# Patient Record
Sex: FEMALE | URBAN - METROPOLITAN AREA
[De-identification: names, ages, dates, MRNs, and addresses within clinical notes are randomized per-mention and may not be internally consistent; named-entity substitution may affect disease eponyms.]

---

## 2017-11-13 ENCOUNTER — HOSPITAL ENCOUNTER (OUTPATIENT)
Dept: OBSTETRICS AND GYNECOLOGY | Facility: HOSPITAL | Age: 37
End: 2017-11-14
Attending: OBSTETRICS & GYNECOLOGY | Admitting: OBSTETRICS & GYNECOLOGY

## 2017-11-13 LAB
ABS NEUT (OLG): 5.35 X10(3)/MCL (ref 2.1–9.2)
ALBUMIN SERPL-MCNC: 3.9 GM/DL (ref 3.4–5)
ALBUMIN/GLOB SERPL: 1.1 {RATIO}
ALP SERPL-CCNC: 70 UNIT/L (ref 38–126)
ALT SERPL-CCNC: 20 UNIT/L (ref 12–78)
APPEARANCE, UA: CLEAR
AST SERPL-CCNC: 12 UNIT/L (ref 15–37)
B-HCG FREE SERPL-ACNC: 3033 MIU/ML
BACTERIA SPEC CULT: ABNORMAL /HPF
BASOPHILS # BLD AUTO: 0 X10(3)/MCL (ref 0–0.2)
BASOPHILS NFR BLD AUTO: 0 %
BILIRUB SERPL-MCNC: 0.5 MG/DL (ref 0.2–1)
BILIRUB UR QL STRIP: NEGATIVE
BILIRUBIN DIRECT+TOT PNL SERPL-MCNC: 0.1 MG/DL (ref 0–0.2)
BILIRUBIN DIRECT+TOT PNL SERPL-MCNC: 0.4 MG/DL (ref 0–0.8)
BUN SERPL-MCNC: 5 MG/DL (ref 7–18)
CALCIUM SERPL-MCNC: 9.7 MG/DL (ref 8.5–10.1)
CHLORIDE SERPL-SCNC: 105 MMOL/L (ref 98–107)
CO2 SERPL-SCNC: 22 MMOL/L (ref 21–32)
COLOR UR: YELLOW
CREAT SERPL-MCNC: 0.74 MG/DL (ref 0.55–1.02)
EOSINOPHIL # BLD AUTO: 0 X10(3)/MCL (ref 0–0.9)
EOSINOPHIL NFR BLD AUTO: 0 %
ERYTHROCYTE [DISTWIDTH] IN BLOOD BY AUTOMATED COUNT: 15.4 % (ref 11.5–17)
GLOBULIN SER-MCNC: 3.6 GM/DL (ref 2.4–3.5)
GLUCOSE (UA): NEGATIVE
GLUCOSE SERPL-MCNC: 93 MG/DL (ref 74–106)
HCT VFR BLD AUTO: 41.1 % (ref 37–47)
HGB BLD-MCNC: 14.2 GM/DL (ref 12–16)
HGB UR QL STRIP: ABNORMAL
KETONES UR QL STRIP: NEGATIVE
LEUKOCYTE ESTERASE UR QL STRIP: NEGATIVE
LYMPHOCYTES # BLD AUTO: 2.6 X10(3)/MCL (ref 0.6–4.6)
LYMPHOCYTES NFR BLD AUTO: 30 %
MCH RBC QN AUTO: 29.2 PG (ref 27–31)
MCHC RBC AUTO-ENTMCNC: 34.5 GM/DL (ref 33–36)
MCV RBC AUTO: 84.4 FL (ref 80–94)
MONOCYTES # BLD AUTO: 0.7 X10(3)/MCL (ref 0.1–1.3)
MONOCYTES NFR BLD AUTO: 8 %
NEUTROPHILS # BLD AUTO: 5.35 X10(3)/MCL (ref 2.1–9.2)
NEUTROPHILS NFR BLD AUTO: 61 %
NITRITE UR QL STRIP: NEGATIVE
PH UR STRIP: 6.5 [PH] (ref 5–9)
PLATELET # BLD AUTO: 237 X10(3)/MCL (ref 130–400)
PMV BLD AUTO: 10.7 FL (ref 9.4–12.4)
POTASSIUM SERPL-SCNC: 4.2 MMOL/L (ref 3.5–5.1)
PROT SERPL-MCNC: 7.5 GM/DL (ref 6.4–8.2)
PROT UR QL STRIP: NEGATIVE
RBC # BLD AUTO: 4.87 X10(6)/MCL (ref 4.2–5.4)
RBC #/AREA URNS HPF: 72 /HPF (ref 0–2)
SODIUM SERPL-SCNC: 136 MMOL/L (ref 136–145)
SP GR UR STRIP: 1.01 (ref 1–1.03)
SQUAMOUS EPITHELIAL, UA: ABNORMAL
UROBILINOGEN UR STRIP-ACNC: 0.2
WBC # SPEC AUTO: 8.8 X10(3)/MCL (ref 4.5–11.5)
WBC #/AREA URNS HPF: ABNORMAL /[HPF]

## 2017-11-18 ENCOUNTER — HOSPITAL ENCOUNTER (OUTPATIENT)
Dept: OBSTETRICS AND GYNECOLOGY | Facility: HOSPITAL | Age: 37
End: 2017-11-19
Attending: OBSTETRICS & GYNECOLOGY | Admitting: OBSTETRICS & GYNECOLOGY

## 2017-11-18 LAB
ABS NEUT (OLG): 5.19 X10(3)/MCL (ref 2.1–9.2)
ALBUMIN SERPL-MCNC: 3.2 GM/DL (ref 3.4–5)
ALBUMIN/GLOB SERPL: 0.8 RATIO (ref 1.1–2)
ALP SERPL-CCNC: 59 UNIT/L (ref 38–126)
ALT SERPL-CCNC: 34 UNIT/L (ref 12–78)
APPEARANCE, UA: CLEAR
AST SERPL-CCNC: 23 UNIT/L (ref 15–37)
B-HCG FREE SERPL-ACNC: 730 MIU/ML
BACTERIA SPEC CULT: ABNORMAL /HPF
BASOPHILS # BLD AUTO: 0 X10(3)/MCL (ref 0–0.2)
BASOPHILS NFR BLD AUTO: 0 %
BILIRUB SERPL-MCNC: 0.6 MG/DL (ref 0.2–1)
BILIRUB UR QL STRIP: NEGATIVE
BILIRUBIN DIRECT+TOT PNL SERPL-MCNC: 0.1 MG/DL (ref 0–0.5)
BILIRUBIN DIRECT+TOT PNL SERPL-MCNC: 0.5 MG/DL (ref 0–0.8)
BUN SERPL-MCNC: 6 MG/DL (ref 7–18)
CALCIUM SERPL-MCNC: 8.5 MG/DL (ref 8.5–10.1)
CHLORIDE SERPL-SCNC: 106 MMOL/L (ref 98–107)
CO2 SERPL-SCNC: 22 MMOL/L (ref 21–32)
COLOR UR: YELLOW
CREAT SERPL-MCNC: 0.65 MG/DL (ref 0.55–1.02)
EOSINOPHIL # BLD AUTO: 0.1 X10(3)/MCL (ref 0–0.9)
EOSINOPHIL NFR BLD AUTO: 1 %
ERYTHROCYTE [DISTWIDTH] IN BLOOD BY AUTOMATED COUNT: 14.4 % (ref 11.5–17)
GLOBULIN SER-MCNC: 3.8 GM/DL (ref 2.4–3.5)
GLUCOSE (UA): NEGATIVE
GLUCOSE SERPL-MCNC: 88 MG/DL (ref 74–106)
HCT VFR BLD AUTO: 33 % (ref 37–47)
HGB BLD-MCNC: 11.4 GM/DL (ref 12–16)
HGB UR QL STRIP: ABNORMAL
KETONES UR QL STRIP: NEGATIVE
LEUKOCYTE ESTERASE UR QL STRIP: NEGATIVE
LYMPHOCYTES # BLD AUTO: 2.1 X10(3)/MCL (ref 0.6–4.6)
LYMPHOCYTES NFR BLD AUTO: 28 %
MCH RBC QN AUTO: 28.9 PG (ref 27–31)
MCHC RBC AUTO-ENTMCNC: 34.5 GM/DL (ref 33–36)
MCV RBC AUTO: 83.8 FL (ref 80–94)
MONOCYTES # BLD AUTO: 0.2 X10(3)/MCL (ref 0.1–1.3)
MONOCYTES NFR BLD AUTO: 3 %
NEUTROPHILS # BLD AUTO: 5.19 X10(3)/MCL (ref 1.4–7.9)
NEUTROPHILS NFR BLD AUTO: 68 %
NITRITE UR QL STRIP: NEGATIVE
PH UR STRIP: 6 [PH] (ref 5–9)
PLATELET # BLD AUTO: 220 X10(3)/MCL (ref 130–400)
PMV BLD AUTO: 10.7 FL (ref 9.4–12.4)
POTASSIUM SERPL-SCNC: 3.8 MMOL/L (ref 3.5–5.1)
PROT SERPL-MCNC: 7 GM/DL (ref 6.4–8.2)
PROT UR QL STRIP: NEGATIVE
RBC # BLD AUTO: 3.94 X10(6)/MCL (ref 4.2–5.4)
RBC #/AREA URNS HPF: 70 /HPF (ref 0–2)
SODIUM SERPL-SCNC: 138 MMOL/L (ref 136–145)
SP GR UR STRIP: 1.01 (ref 1–1.03)
SQUAMOUS EPITHELIAL, UA: ABNORMAL
UROBILINOGEN UR STRIP-ACNC: 1
WBC # SPEC AUTO: 7.6 X10(3)/MCL (ref 4.5–11.5)
WBC #/AREA URNS HPF: ABNORMAL /[HPF]

## 2017-11-19 LAB
B-HCG FREE SERPL-ACNC: 505 MIU/ML
HCT VFR BLD AUTO: 32.4 % (ref 37–47)
HGB BLD-MCNC: 11.2 GM/DL (ref 12–16)

## 2017-11-20 ENCOUNTER — HISTORICAL (OUTPATIENT)
Dept: ADMINISTRATIVE | Facility: HOSPITAL | Age: 37
End: 2017-11-20

## 2017-11-20 LAB — B-HCG FREE SERPL-ACNC: 355 MIU/ML

## 2018-04-13 ENCOUNTER — HISTORICAL (OUTPATIENT)
Dept: RADIOLOGY | Facility: HOSPITAL | Age: 38
End: 2018-04-13

## 2018-05-03 ENCOUNTER — HISTORICAL (OUTPATIENT)
Dept: INTERNAL MEDICINE | Facility: CLINIC | Age: 38
End: 2018-05-03

## 2018-05-03 ENCOUNTER — HISTORICAL (OUTPATIENT)
Dept: RADIOLOGY | Facility: HOSPITAL | Age: 38
End: 2018-05-03

## 2018-05-03 LAB
ABS NEUT (OLG): 5 X10(3)/MCL (ref 2.1–9.2)
ALBUMIN SERPL-MCNC: 3.6 GM/DL (ref 3.4–5)
ALBUMIN/GLOB SERPL: 1 RATIO (ref 1–2)
ALP SERPL-CCNC: 73 UNIT/L (ref 45–117)
ALT SERPL-CCNC: 23 UNIT/L (ref 12–78)
APPEARANCE, UA: CLEAR
AST SERPL-CCNC: 16 UNIT/L (ref 15–37)
BACTERIA #/AREA URNS AUTO: ABNORMAL /[HPF]
BASOPHILS # BLD AUTO: 0.05 X10(3)/MCL
BASOPHILS NFR BLD AUTO: 1 %
BILIRUB SERPL-MCNC: 0.3 MG/DL (ref 0.2–1)
BILIRUB UR QL STRIP: NEGATIVE
BILIRUBIN DIRECT+TOT PNL SERPL-MCNC: 0.1 MG/DL
BILIRUBIN DIRECT+TOT PNL SERPL-MCNC: 0.2 MG/DL
BUN SERPL-MCNC: 8 MG/DL (ref 7–18)
CALCIUM SERPL-MCNC: 8.7 MG/DL (ref 8.5–10.1)
CHLORIDE SERPL-SCNC: 107 MMOL/L (ref 98–107)
CHOLEST SERPL-MCNC: 160 MG/DL
CHOLEST/HDLC SERPL: 4.3 {RATIO} (ref 0–4.4)
CO2 SERPL-SCNC: 24 MMOL/L (ref 21–32)
COLOR UR: NORMAL
CREAT SERPL-MCNC: 0.9 MG/DL (ref 0.6–1.3)
EOSINOPHIL # BLD AUTO: 0.3 X10(3)/MCL
EOSINOPHIL NFR BLD AUTO: 3 %
ERYTHROCYTE [DISTWIDTH] IN BLOOD BY AUTOMATED COUNT: 14.6 % (ref 11.5–14.5)
EST. AVERAGE GLUCOSE BLD GHB EST-MCNC: 97 MG/DL
GLOBULIN SER-MCNC: 4.1 GM/ML (ref 2.3–3.5)
GLUCOSE (UA): NORMAL
GLUCOSE SERPL-MCNC: 105 MG/DL (ref 74–106)
HAV IGM SERPL QL IA: NONREACTIVE
HBA1C MFR BLD: 5 % (ref 4.2–6.3)
HBV CORE IGM SERPL QL IA: NONREACTIVE
HBV SURFACE AG SERPL QL IA: NEGATIVE
HCT VFR BLD AUTO: 40.1 % (ref 35–46)
HCV AB SERPL QL IA: NONREACTIVE
HDLC SERPL-MCNC: 37 MG/DL
HGB BLD-MCNC: 13.8 GM/DL (ref 12–16)
HGB UR QL STRIP: NEGATIVE
HIV 1+2 AB+HIV1 P24 AG SERPL QL IA: NONREACTIVE
HYALINE CASTS #/AREA URNS LPF: ABNORMAL /[LPF]
IMM GRANULOCYTES # BLD AUTO: 0.02 10*3/UL
IMM GRANULOCYTES NFR BLD AUTO: 0 %
KETONES UR QL STRIP: NEGATIVE
LDLC SERPL CALC-MCNC: 94 MG/DL (ref 0–130)
LEUKOCYTE ESTERASE UR QL STRIP: NEGATIVE
LYMPHOCYTES # BLD AUTO: 2.55 X10(3)/MCL
LYMPHOCYTES NFR BLD AUTO: 28 % (ref 13–40)
MCH RBC QN AUTO: 28.5 PG (ref 26–34)
MCHC RBC AUTO-ENTMCNC: 34.4 GM/DL (ref 31–37)
MCV RBC AUTO: 82.9 FL (ref 80–100)
MONOCYTES # BLD AUTO: 1.05 X10(3)/MCL
MONOCYTES NFR BLD AUTO: 12 % (ref 4–12)
NEUTROPHILS # BLD AUTO: 5 X10(3)/MCL
NEUTROPHILS NFR BLD AUTO: 56 X10(3)/MCL
NITRITE UR QL STRIP: NEGATIVE
PH UR STRIP: 5 [PH] (ref 4.5–8)
PLATELET # BLD AUTO: 341 X10(3)/MCL (ref 130–400)
PMV BLD AUTO: 10.5 FL (ref 7.4–10.4)
POTASSIUM SERPL-SCNC: 4.1 MMOL/L (ref 3.5–5.1)
PROT SERPL-MCNC: 7.7 GM/DL (ref 6.4–8.2)
PROT UR QL STRIP: NEGATIVE
RBC # BLD AUTO: 4.84 X10(6)/MCL (ref 4–5.2)
RBC #/AREA URNS AUTO: ABNORMAL /[HPF]
SODIUM SERPL-SCNC: 135 MMOL/L (ref 136–145)
SP GR UR STRIP: 1.01 (ref 1–1.03)
SQUAMOUS #/AREA URNS LPF: ABNORMAL /[LPF]
T PALLIDUM AB SER QL: NONREACTIVE
T4 FREE SERPL-MCNC: 0.86 NG/DL (ref 0.76–1.46)
TRIGL SERPL-MCNC: 145 MG/DL
TSH SERPL-ACNC: 0.13 MIU/L (ref 0.36–3.74)
UROBILINOGEN UR STRIP-ACNC: NORMAL
VLDLC SERPL CALC-MCNC: 29 MG/DL
WBC # SPEC AUTO: 9 X10(3)/MCL (ref 4.5–11)
WBC #/AREA URNS AUTO: ABNORMAL /HPF

## 2019-01-07 ENCOUNTER — HISTORICAL (OUTPATIENT)
Dept: INTERNAL MEDICINE | Facility: CLINIC | Age: 39
End: 2019-01-07

## 2022-04-10 ENCOUNTER — HISTORICAL (OUTPATIENT)
Dept: ADMINISTRATIVE | Facility: HOSPITAL | Age: 42
End: 2022-04-10

## 2022-04-26 VITALS
DIASTOLIC BLOOD PRESSURE: 84 MMHG | WEIGHT: 223.13 LBS | BODY MASS INDEX: 43.81 KG/M2 | SYSTOLIC BLOOD PRESSURE: 132 MMHG | OXYGEN SATURATION: 99 % | HEIGHT: 60 IN

## 2022-04-30 NOTE — H&P
Patient:   Sarath Abbott             MRN: [id number]            FIN: [id number]               Age:   37 years     Sex:  Female     :  1980   Associated Diagnoses:   None   Author:   Juarez Mandel MD      Basic Information   Gestational Age:          * Note: EGA calculated as of 2017     No EGA/DORI calculations have been recorded  .       Chief Complaint   Patient has presented to the emergency room with complaints of abdominal/right flank pain.  Patient has had vaginal bleeding over the past 3-4 days.  This is the 3rd emergency room visit and her beta-hCGs have plateaued.  Patient denies any nausea vomiting fever chills chest pain.      History of Present Illness   Patient is     Para Information:   : 6   Para Term: 4   Para : 0   Para Abortions: 1   Para Living: .        Review of Systems   General_ anxious but overall well appearance  HENT_normal  Respiratory_normal  Cardiovascular_normal  Gastrointestinal_normal  Genitourinary_normal  Musculoskeletal_normal  Integumentary_normal         Health Status   Allergies:    Allergic Reactions (All)  Severity Not Documented  Aspirin- Increased heart rate.  Canceled/Inactive Reactions (All)  No Known Allergies   Problem list:    All Problems  Obesity / SNOMED CT [id number] / Probable      Histories      Pregnancy History      Pregnancy History   ,0,0,0          No previous pregnancies history have been recorded     Family History:    No family history items have been selected or recorded.      Prenatal History         No lab results have been documented     Lab from flowsheet   2017 10:28 CST     Sodium Lvl                136 mmol/L                             Potassium Lvl             4.2 mmol/L                             Chloride                  105 mmol/L                             CO2                       22.0 mmol/L                             Calcium Lvl               9.7 mg/dL                              Glucose Lvl               93 mg/dL                             BUN                       5.0 mg/dL  LOW                             Creatinine                0.74 mg/dL                             eGFR-AA                   >60 mL/min/1.73 m2  NA                             eGFR-IQRA                  >60 mL/min/1.73 m2  NA                             Bili Total                0.5 mg/dL                             Bili Direct               0.10 mg/dL                             Bili Indirect             0.40 mg/dL                             AST                       12 unit/L  LOW                             ALT                       20 unit/L                             Alk Phos                  70 unit/L                             Total Protein             7.5 gm/dL                             Albumin Lvl               3.90 gm/dL                             Globulin                  3.60 gm/dL  HI                             A/G Ratio                 1.1  NA                             Beta hCG Qnt              3,033.0 mIU/mL  NA                             WBC                       8.8 x10(3)/mcL                             RBC                       4.87 x10(6)/mcL                             Hgb                       14.2 gm/dL                             Hct                       41.1 %                             Platelet                  237 x10(3)/mcL                             MCV                       84.4 fL                             MCH                       29.2 pg                             MCHC                      34.5 gm/dL                             RDW                       15.4 %                             MPV                       10.7 fL                             Abs Neut                  5.35 x10(3)/mcL                             Neutro Auto               61 %  NA                             Lymph Auto                30 %  NA                             Mono Auto                 8 %   NA                             Eos Auto                  0 %  NA                             Abs Eos                   0.0 x10(3)/mcL                             Basophil Auto             0 %  NA                             Abs Neutro                5.35 x10(3)/mcL                             Abs Lymph                 2.6 x10(3)/mcL                             Abs Mono                  0.7 x10(3)/mcL                             Abs Baso                  0.0 x10(3)/mcL    2017 10:15 CST     UA Appear                 CLEAR                             UA Color                  YELLOW                             UA Spec Grav              1.011                             UA Bili                   Negative                             UA pH                     6.5                             UA Urobilinogen           0.2                             UA Blood                  3+                             UA Glucose                Negative                             UA Ketones                Negative                             UA Protein                Negative                             UA Nitrite                Negative                             UA Leuk Est               Negative                             UA WBC                    NONE SEEN                             UA RBC                    72 /HPF  HI                             UA Bacteria               NONE SEEN /HPF                             UA Squam Epithelial       NONE SEEN     Procedure history:    Surgery for ectopic pregnancy on the right side, .   Social History        Social & Psychosocial History  Social History   Alcohol   Low Risk (2012)   Current, 1-2 times per month     Substance Abuse   Denies Substance Abuse (2012)   Never     Tobacco   Medium Risk (2012)   Current, Cigarettes   Comment: 0.5 pack/day (2012 08:52 - Jae MORGAN, Brandi MORRISON,)    Psychosocial History   No active psychosocial history has been  recorded  .        Physical Examination      Vital Signs (last 24 hrs)_____  Last Charted___________  Temp Oral     37.0 DegC  ( 09:43)  Heart Rate Peripheral   76 bpm  (NOV 13 15:)  Resp Rate         18 br/min  (NOV 13 15:)  SBP      H 143mmHg  (NOV 13 15:)  DBP      78 mmHg  (NOV 13 15:)  SpO2      100 %  (NOV 13 15:)     Clear to auscultation bilaterally  Abdomen soft nontender no rebound no guarding  Right lower abdominal tenderness to deep palpation  Bilateral lower extremity no edema or tenderness     joy For Exam  Abdominal Pain    Radiology Report  Early OB ultrasound     Exam is performed both transabdominally and endovaginally     Uterus measures 8.3 x 5.6 x 5.5 cm. The endometrial stripe is 7 mm.     An intrauterine gestation is not identified.     The right ovary measures 4.5 x 5.5 cm. There is a cyst present  measuring 3.5 x 3.9 x 3.4 cm. There is flow present.     There is a complex area in the right adnexa measuring 3.9 x 3 x 3.9  cm. There is flow within this. The etiology of this is uncertain. A  fetal pole is not identified within this area.     The left ovary measures 2.6 x 1.7 x 3.3 cm. There is flow present.     IMPRESSION: An intrauterine gestation is not identified. Differential  would include very early intrauterine pregnancy versus complete AB  versus ectopic pregnancy.     Right ovarian cyst.     Complex lesion in the right adnexa.            Impression and Plan   37-year-old  presented to the emergency room with ectopic pregnancy  Patient for observation on mother-baby  Long discussion with the patient on ectopic pregnancy and the urgency of the condition  Discussed methotrexate vs surgery  On ultrasound today there was no IUP with a beta hCG that is greater than 2500 and hCG has plateaued over the last 48 hours these sign are concerning for an ectopic pregnancy  I discussed with the patient that without removal of her fallopian tube it is possible to have a repeat  ectopic in that tube  I discussed with the patient despite methotrexate therapy she may still need surgery  I discussed with the patient that she would need to be followed on day 4 and day 7 for beta hCG levels to reassure that the ectopic pregnancy was resolved  The patient had had a previous ectopic and the right fallopian tube and the patient was unsure whether it was a salpingectomy versus salpingostomy  After a long discussion the patient decided to proceed with methotrexate therapy the patient signed consent as well as verbalize consent  The patient will be followed up in [facility] for beta hCG levels  Emergency Room precautions given

## 2022-04-30 NOTE — DISCHARGE SUMMARY
Patient:   Sarath Abbott             MRN: [id number]            FIN: [id number]               Age:   37 years     Sex:  Female     :  1980   Associated Diagnoses:   None   Author:   Adan Monroy MD      Discharge Summary    Date of admission: 2017  Date of discharge: 2017    Admit diagnosis: Ectopic pregnancy    Discharge diagnosis:    Unspecified ectopic pregnancy without intrauterine pregnancy (O00.90)     Hospital Course  Presenting complaint: 36yo  reports right side abd pain that radiates to right flank, vaginal bleeding since yesterday (2017) appr 5 weeks pregant, fourth ER visit for same     Hospital course: Patient was administered MTX 93mg  and patient tolerated well. Denies fever, chills, nausea, vomiting, chest pain,  or shortness of breath morning of 2017.    Positive physical findings: Vaginal bleeding    Major test results: Ultrasound negative for IUP  beta hCG plateau 3033 on 17; 2979 on 2017    Procedures: none    Other important diagnoses: Previous ectopic pregnancy    PMHx: HTN, anxiety    Allergies: ASA    Consultants: none      Discharge Plan  Physical Examination at time of discharge  Vital Signs   2017 4:14 CST      Temperature Oral          37.4 DegC                             Peripheral Pulse Rate     78 bpm                             Respiratory Rate          18 br/min                             Systolic Blood Pressure   123 mmHg                             Diastolic Blood Pressure  81 mmHg    2017 18:00 CST     Systolic Blood Pressure   149 mmHg  HI                             Diastolic Blood Pressure  81 mmHg     Measurements from flowsheet : Measurements   2017 17:56 CST     Weight Dosing             90.3 kg                             Height/Length Dosing      152 cm     General:  No acute distress.    Eye:  Normal conjunctiva.    Respiratory:  Lungs are clear to auscultation, Respirations are non-labored,  Breath sounds are equal, Symmetrical chest wall expansion.    Cardiovascular:  Normal rate, Regular rhythm, No murmur, No edema.    Gastrointestinal:  Soft, Mild tenderness to palpation upper abdomen midline and lower left quadrant.    Musculoskeletal:  No swelling, No deformity.    Integumentary:  Warm, Dry, Intact.    Cognition and Speech:  Speech clear and coherent.      Discharge condition: Stable    Discharge medications:   Discharge Med Rec is not complete    Discharge instructions: Activity as tolerated, diet as tolerated. Report to ED if bleeding increases or pain worsening    Follow-up plan: Will require follow-up beta hCG levels 11/17/2017 and 11/20/2017    Patient discharged to: Home

## 2022-04-30 NOTE — ED PROVIDER NOTES
"   Patient:   Sarath Abbott             MRN: [id number]            FIN: [id number]               Age:   37 years     Sex:  Female     :  1980   Associated Diagnoses:   Vaginal bleeding; Ectopic pregnancy; Uncontrolled pain   Author:   Darya Whitley MD      Basic Information   Time seen: Date & time 2017 04:43:00.   History source: Patient.   Arrival mode: Private vehicle.   History limitation: None.   Additional information: Chief Complaint from Nursing Triage Note : Chief Complaint   2017 4:24 CST      Chief Complaint           monday had an ectopic preg/ given chemo she states and sent upstairs fo admit/ discharged tuesday( dr cintron)/ at 2pm started with increased pain to lower back  and increased bleeding with large amt of blood clots( states feeling like labor pains)    2017 8:25 CST      Chief Complaint           pt to ER via [facility] for flu like symptoms.  pt was treated for ectopic 4-5 days ago.  today pt has body aches and headache and n/v.  .      History of Present Illness   The patient presents with back pain and 38 y/o AAF presents to the ED c/o lower back pain and vaginal bleeding onset just prior to arrival when she woke up to use the bathroom. Pt says she passed small clots initially, followed by a large one. Pt had an ectopic pregnancy on Monday () and was d/c on Tuesday (). Pt says her back pain is similar to when she has her periods..  The onset was 2017 .  The course/duration of symptoms is constant.  Type of injury: none.  The location where the incident occurred was at home.  Location: lumbar. Radiating pain: none. The character of symptoms is "pain".  The degree at onset was moderate.  The degree at present is moderate.  The exacerbating factor is none.  The relieving factor is none.  Risk factors consist of hypertension.  Prior episodes: Same pain during past periods.  Therapy today: none.  Associated symptoms: none.  Additional history: none.   "      Review of Systems   Constitutional symptoms:  Negative except as documented in HPI.   Skin symptoms:  Negative except as documented in HPI.   Eye symptoms:  Negative except as documented in HPI.   ENMT symptoms:  Negative except as documented in HPI.   Respiratory symptoms:  Negative except as documented in HPI.   Cardiovascular symptoms:  Negative except as documented in HPI.   Gastrointestinal symptoms:  Negative except as documented in HPI.   Genitourinary symptoms:  Vaginal bleeding.   Musculoskeletal symptoms:  Back pain.   Neurologic symptoms:  Negative except as documented in HPI.   Psychiatric symptoms:  Negative except as documented in HPI.   Endocrine symptoms:  Negative except as documented in HPI.   Hematologic/Lymphatic symptoms:  Negative except as documented in HPI.   Allergy/immunologic symptoms:  Negative except as documented in HPI.             Additional review of systems information: All other systems reviewed and otherwise negative.      Health Status   Allergies:    Allergic Reactions (Selected)  Severity Not Documented  Aspirin- Increased heart rate.  Morphine- Increased heart rate..   Medications:  (Selected)   Prescriptions  Prescribed  Percocet 10/325 oral tablet: 1 tab, Oral, q4hr, PRN PRN as needed for pain, X 3 day(s), # 24 tab(s), 0 Refill(s).      Past Medical/ Family/ Social History   Medical history:    Resolved  Ectopic pregnancy (633.90):  Resolved.  HYPERTENSION (997.91):  Resolved..   Surgical history:    Surgery for ectopic pregnancy on the right side, ..   Family history: Not significant.   Social history.   Physical Examination               Vital Signs   Vital Signs   2017 4:24 CST      Temperature Oral          36.6 DegC                             Temperature Oral (calculated)             97.88 DegF                             Peripheral Pulse Rate     76 bpm                             Respiratory Rate          20 br/min                             SpO2  "                     99 %                             Oxygen Therapy            Room air                             Systolic Blood Pressure   140 mmHg                             Diastolic Blood Pressure  69 mmHg  .   Measurements   11/18/2017 4:24 CST      Weight Dosing             82 kg                             Weight Measured and Calculated in Lbs     180.78 lb                             Weight Estimated          82 kg                             Height/Length Dosing      152 cm                             Height/Length Estimated   152 cm                             Body Mass Index Estimated 35.49 kg/m2  .   Basic Oxygen Information   11/18/2017 4:24 CST      SpO2                      99 %                             Oxygen Therapy            Room air  .   General:  Alert, no acute distress.    Skin:  Warm, dry.    Head:  Normocephalic, atraumatic.    Neck:  Supple, trachea midline.    Eye:  Normal conjunctiva.   Ears, nose, mouth and throat:  Oral mucosa moist.   Cardiovascular:  Regular rate and rhythm, No murmur, Normal peripheral perfusion, No edema.    Respiratory:  Lungs are clear to auscultation, respirations are non-labored.    Gastrointestinal:  Soft, Non distended, Normal bowel sounds, Tenderness: Mild, suprapubic, right lower quadrant, Guarding: Negative, Rebound: Negative, Signs: McBurney's negative, Vargas's negative, Rovsing's negative.    Genitourinary:  External genitalia: Normal, Speculum exam: Vaginal, bleeding, blood clots, Bimanual exam: Right, adnexa, tenderness, no cervix motion tenderness.    Back:  No costovertebral angle tenderness,    Neurological:  Alert and oriented to person, place, time, and situation.   Psychiatric:  Cooperative.      Medical Decision Making   Documents reviewed:  Emergency department nurses' notes.   Orders  Launch Order Profile (Selected)   Inpatient Orders  Ordered  30 Day Readmission: 11/18/17 4:29:08 CST, Stop date 11/18/17 4:29:08 CST, "This patient has " "had an inpatient, observation, outpatient bedded or emergency visit within the last 30 days."  Admit to Outpatient Observation: 11/18/17 8:07:00 CST, Meliton Marshall MD, [facility] NUNES [facility], No  Capacity Management Bed Request: 11/18/17 8:07:41 CST, [facility]  Discharge Planning Ongoing Assessment: 11/21/17 9:00:00 CST, q3day  pelvic setup with ring forceps to bedside please: 11/18/17 4:38:00 CST, pelvic setup with ring forceps to bedside please  Ordered (Exam Completed)  US Pelvic Non-Obstetrics: Stat, 11/18/17 5:18:00 CST, Pelvic Pain, dx ectopic, now s/p methotrexate, increasing pain and bleeding, eval for pelvic FF, change in ectopic mass, None, Stretcher, Rad Type, Schedule this test, [facility], 11/18/17 5:18:00 CST  Completed  Automated Diff: Stat collect, 11/18/17 4:52:00 CST, Blood, Collected, Once, Stop date 11/18/17 4:52:00 CST, Lab Collect, Print Label By Order Location, 11/18/17 4:37:00 CST  Beta hCG Quantitative: Stat collect, 11/18/17 4:37:00 CST, Blood, Stop date 11/18/17 4:38:00 CST, Lab Collect, Print Label By Order Location, 11/18/17 4:37:00 CST  CBC w/ Auto Diff: Stat collect, 11/18/17 4:37:00 CST, Blood, Once, Stop date 11/18/17 4:38:00 CST, Lab Collect, Print Label By Order Location, 11/18/17 4:37:00 CST  CMP: Stat collect, 11/18/17 5:17:00 CST, Blood, Once, Stop date 11/18/17 5:17:00 CST, Lab Collect, Print Label By Order Location, 11/18/17 5:17:00 CST  Estimated Glomerular Filtration Rate: Stat collect, 11/18/17 4:52:00 CST, Blood, Collected, Once, Stop date 11/18/17 4:52:00 CST, Lab Collect, Print Label By Order Location, 11/18/17 5:17:00 CST  HYDROmorphone: 1 mg, form: Injection, IV, AdHoc, first dose 11/18/17 5:59:00 CST, stop date 11/18/17 5:59:00 CST, [id number]  Toradol 30 mg for IV Push: 30 mg, form: Injection, IV, Once, Infuse over: 20 minute(s), first dose 11/18/17 5:52:00 CST, stop date 11/18/17 5:52:00 CST, STAT, 24  Urinalysis Complete a reflex to culture: Stat " collect, Urine, 11/18/17 4:38:00 CST, Once, Stop date 11/18/17 4:38:00 CST, Nurse collect, Print Label By Order Location  ondansetron: 4 mg, form: Injection, IV, AdHoc, first dose 11/18/17 5:59:00 CST, stop date 11/18/17 5:59:00 CST, [id number]  Discontinued  Clostridium Difficile by PCR: Stat collect, 11/18/17 4:37:00 CST, Stool, Stop date 11/18/17 4:38:00 CST, Nurse collect, Print Label By Order Location, 11/18/17 4:37:00 CST  Toradol 30 mg for IV Push: 30 mg, form: Injection, IM, Once, first dose 11/18/17 5:47:00 CST, stop date 11/18/17 5:47:00 CST, STAT, 24, Laboratory    CBC w/ Auto Diff, Gutierrez GRAJEDA, Darya ESPINOZA, 11/18/17, 04:37, Ordered    Clostridium Difficile by PCR, Darya Whitley MD, 11/18/17, 04:37, Ordered    Beta hCG Quantitative, Darya Whitley MD, 11/18/17, 04:37, Ordered    Urinalysis Complete a reflex to culture, Darya Whitley MD, 11/18/17, 04:38, Ordered.    Results review:  Lab results : Lab View   11/18/2017 5:12 CST      UA Appear                 CLEAR                             UA Color                  YELLOW                             UA Spec Grav              1.007                             UA Bili                   Negative                             UA pH                     6.0                             UA Urobilinogen           1.0                             UA Blood                  3+                             UA Glucose                Negative                             UA Ketones                Negative                             UA Protein                Negative                             UA Nitrite                Negative                             UA Leuk Est               Negative                             UA WBC                    NONE SEEN                             UA RBC                    70 /HPF  HI                             UA Bacteria               NONE SEEN /HPF                             UA Squam Epithelial       NONE SEEN    11/18/2017 4:52 CST       Beta hCG Qnt              730.0 mIU/mL  NA                             WBC                       7.6 x10(3)/mcL                             RBC                       3.94 x10(6)/mcL  LOW                             Hgb                       11.4 gm/dL  LOW                             Hct                       33.0 %  LOW                             Platelet                  220 x10(3)/mcL                             MCV                       83.8 fL                             MCH                       28.9 pg                             MCHC                      34.5 gm/dL                             RDW                       14.4 %                             MPV                       10.7 fL                             Abs Neut                  5.19 x10(3)/mcL                             Neutro Auto               68 %  NA                             Lymph Auto                28 %  NA                             Mono Auto                 3 %  NA                             Eos Auto                  1 %  NA                             Abs Eos                   0.1 x10(3)/mcL                             Basophil Auto             0 %  NA                             Abs Neutro                5.19 x10(3)/mcL                             Abs Lymph                 2.1 x10(3)/mcL                             Abs Mono                  0.2 x10(3)/mcL                             Abs Baso                  0.0 x10(3)/mcL  , Interpretation Abnormal results  Small drop in H&H compared to prior, decreasing beta hCG.    Radiology results:  Ultrasound, Pelvis, reviewed radiologist's report (Radiology tech preliminary), interpretation:  Uterus normal neck    Small amount of free fluid in the cul-de-sac    Right adnexal mass 6.81 cm was 6.61 cm on 11:15    Right ovarian cyst 3.49 cm next    Bilateral ovarian flow    No left adnexal mass.       Reexamination/ Reevaluation   Vital signs   results included from flowsheet : Vital Signs    11/18/2017 7:18 CST      Peripheral Pulse Rate     61 bpm                             SpO2                      98 %    11/18/2017 7:00 CST      Peripheral Pulse Rate     60 bpm                             Respiratory Rate          18 br/min                             SpO2                      98 %                             Oxygen Therapy            Room air                             Systolic Blood Pressure   130 mmHg                             Diastolic Blood Pressure  86 mmHg                             Mean Arterial Pressure, Cuff              101 mmHg    11/18/2017 6:11 CST      Peripheral Pulse Rate     73 bpm                             Respiratory Rate          20 br/min                             SpO2                      100 %                             Oxygen Therapy            Room air                             Systolic Blood Pressure   138 mmHg                             Diastolic Blood Pressure  90 mmHg                             Mean Arterial Pressure, Cuff              106 mmHg    11/18/2017 5:27 CST      Peripheral Pulse Rate     73 bpm                             Respiratory Rate          20 br/min                             SpO2                      100 %                             Oxygen Therapy            Room air                             Systolic Blood Pressure   132 mmHg                             Diastolic Blood Pressure  66 mmHg                             Mean Arterial Pressure, Cuff              88 mmHg    11/18/2017 4:55 CST      Respiratory Rate          36 br/min  HI                             SpO2                      100 %     Course: improving.   Pain status: decreased.   Notes: Labs notable for hemoglobin 1 point drop compared to yesterday, beta hCG is downtrending as well.  Patient's pain somewhat improved at this time after medications are administered.  Pelvic examination with some clots, mild vaginal bleeding.  Abdomen is soft, mildly tender throughout but no  peritoneal signs.  No CVA tenderness.  Ultrasound obtained which demonstrates a slight increase in the size of the right adnexal mass, small free fluid in the cul-de-sac.  I discussed the case with Dr. Mandel, patient's ObGyn, who recommends admission for serial H&H, pain control.  He discussed the case with Dr. Diego Coelho who agrees to admission. Findings and plan discussed with the patient, and she is agreeable to admission at this time.   .      Impression and Plan   Diagnosis   Vaginal bleeding (BBV42-VA N93.9)   Ectopic pregnancy (CFB77-AP O00.90)   Uncontrolled pain (BCY05-KA R52)   Plan   Condition: Stable.    Disposition: Admit time  11/18/2017 08:10:00, Place in Observation Unit, Meliton Marshall MD, Diego MANNING    Counseled: Patient, Regarding diagnosis, Regarding diagnostic results, Regarding treatment plan, Patient indicated understanding of instructions.    Notes: Luciano CRISOSTOMO, acted solely as a scribe for and in the presence of Dr. Whitley who performed the service., IDarya, have independently performed the history, physical, medical decision making and procedures as documented above and agree with the scribe's documentation. .

## 2022-04-30 NOTE — ED PROVIDER NOTES
Patient:   Sarath Abbott             MRN: [id number]            FIN: [id number]               Age:   37 years     Sex:  Female     :  1980   Associated Diagnoses:   Ectopic pregnancy   Author:   Lorraine Wallace      Basic Information   Time seen: Date & time 2017 09:49:00.   History source: Patient.   Arrival mode: Private vehicle.   History limitation: None.   Additional information: Chief Complaint from Nursing Triage Note : Chief Complaint   2017 9:43 CST      Chief Complaint           R side abd pain that radiates to R flank, vaginal bleeding since yesterday appr 5 weeks pregant, fourth ER visit for same  (Modified) .      History of Present Illness   The patient presents with  at 5 weeks gestation presents with abdominal pain and passing clots vaginally.  She has been seen several times over the last few days that different emergency rooms for the same complaint.  PARAMJIT Wong NP. and JAREN CRISOSTOMO fnp assumed care of the patient.  Patient is a 37-year-old female who presents with stating that she is pregnant and she started to have right side abdominal pain worsening since yesterday she also states that she started to have vaginal bleeding yesterday that worsened today.  She has a  6 para 4.  She had an ectopic 11 years ago she thinks on the right side.  She has seen Dr. Payne throughout her whole pregnancies however she has an appointment with him on Thursday for this when she has not seen him yet for this pregnancy.  She has been seen here a few times for the abdominal pain with no IUP detected yet.  The onset was 1  days ago.  The course/duration of symptoms is constant.  The character of symptoms is sharp and pressure.  The degree at onset was minimal.  The Location of pain at onset was right, lower and abdominal.  The degree at present is severe.  The Location of pain at present is right, lower and abdominal.  Radiating pain: none. The exacerbating factor is  none.  The relieving factor is none.  Therapy today: none.  Risk factors consist of pregnancy.  Associated symptoms: vaginal bleeding.        Review of Systems   Constitutional symptoms:  No fever, no chills, no weakness.    Skin symptoms:  No rash, no pruritus.    Respiratory symptoms:  No shortness of breath, no orthopnea, no cough.    Cardiovascular symptoms:  No chest pain, no palpitations, no peripheral edema.    Gastrointestinal symptoms:  Abdominal pain, moderate, right lower quadrant, pelvic, pain, no nausea, no vomiting.    Genitourinary symptoms:  Vaginal bleeding.   Neurologic symptoms:  No headache, no dizziness.              Additional review of systems information: All other systems reviewed and otherwise negative.      Health Status   Allergies:    Allergic Reactions (Selected)  Severity Not Documented  Aspirin- Increased heart rate..   Pregnancy history: Currently pregnant,  6, para 4.      Past Medical/ Family/ Social History   Medical history:    Resolved  Ectopic pregnancy (633.90):  Resolved.  HYPERTENSION (997.91):  Resolved..   Surgical history:    Surgery for ectopic pregnancy on the right side, ..   Family history: Not significant.   Social history:    Social & Psychosocial Habits    Alcohol  2012 Risk Assessment: Low Risk    2012  Use: Current    Frequency: 1-2 times per month    Substance Abuse  2012 Risk Assessment: Denies Substance Abuse    2017  Use: Never    Tobacco  2012  Use: Current    Type: Cigarettes    Comment: 0.5 pack/day - 2012 08:52 - Jae MORGAN, Brandi MORRISON,    2012 Risk Assessment: Medium Risk  , Alcohol use: Denies, Drug use: Denies, Family/social situation: Unmarried.      Physical Examination               Vital Signs   Vital Signs   2017 9:43 CST      Temperature Oral          37.0 DegC                             Peripheral Pulse Rate     90 bpm                             Respiratory Rate          20 br/min                              SpO2                      100 %                             Oxygen Therapy            Room air                             Systolic Blood Pressure   146 mmHg  HI                             Diastolic Blood Pressure  75 mmHg  .   Measurements   11/13/2017 9:43 CST      Weight Dosing             81 kg                             Weight Measured and Calculated in Lbs     178.57 lb                             Weight Estimated          81 kg                             Height/Length Dosing      152 cm                             Height/Length Estimated   152 cm                             Body Mass Index Estimated 35.06 kg/m2  .   General:  Alert, no acute distress.    Skin:  Warm, dry, pink.    Head:  Normocephalic, atraumatic.    Neck:  Supple, trachea midline, no tenderness.    Eye:  Pupils are equal, round and reactive to light, extraocular movements are intact.    Cardiovascular:  Regular rate and rhythm, No murmur, Normal peripheral perfusion.    Respiratory:  Lungs are clear to auscultation, respirations are non-labored, breath sounds are equal.    Gastrointestinal:  Soft, Non distended, Tenderness: Moderate, right lower quadrant, Guarding: Minimal, Rebound: Negative, Bowel sounds: Normal, Signs: McBurney's negative, Vargas's negative.    Back:  Nontender, Normal range of motion.    Musculoskeletal:  Normal ROM, normal strength.    Neurological:  Alert and oriented to person, place, time, and situation, No focal neurological deficit observed, CN II-XII intact, normal sensory observed, normal motor observed, normal speech observed, normal coordination observed.    Psychiatric:  Cooperative, appropriate mood & affect.       Medical Decision Making   Differential Diagnosis:  Abdominal pain, urinary tract infection, ectopic pregnancy.    Documents reviewed:  Emergency department nurses' notes.   Orders  Launch Orders   Laboratory:  [facility] [place] Quantitative [id number] (Order): Stat collect, 11/13/2017 9:50  CST, Blood, Lab Collect, Print Label By Order Location, 11/13/2017 9:50 CST, Launch Orders   Laboratory:  CMP (Order): Stat collect, 11/13/2017 9:51 CST, Blood, Lab Collect, Print Label By Order Location, 11/13/2017 9:51 CST  CBC w/ Auto Diff (Order): Now collect, 11/13/2017 9:50 CST, Blood, Lab Collect, Print Label By Order Location, 11/13/2017 9:50 CST  Urinalysis Complete a reflex to culture (Order): Stat collect, Urine, 11/13/2017 9:50 CST, Nurse collect, Print Label By Order Location  , Launch Orders   Radiology:  US OB 1st Trimester (Order): Stat, 11/13/2017 12:43 CST, Abdominal Pain, None, Stretcher, Rad Type, Schedule this test, [facility]  , Launch Orders   Pharmacy:  morphine 2 mg/mL injectable solution (Order): 2 mg, IV, Once, first dose 11/13/2017 13:32 CST, stop date 11/13/2017 13:32 CST, 24  Zofran 2 mg/mL injectable solution (Order): 4 mg, form: Injection, IV Push, Once, first dose 11/13/2017 13:32 CST, stop date 11/13/2017 13:32 CST, 24  , Launch Orders   Pharmacy:  Normal Saline Infusion 1000 mL (Order): 1,000 mL, 1,000 mL, IV, 500 mL/hr, start date 11/13/2017 13:32 CST  , Launch Orders   Cardiology:  EKG (Order): 11/13/2017 14:18 CST, NOW, -1, -1, 11/13/2017 14:18 CST  , Launch Orders   Admit/Transfer/Discharge:  Admit to Outpatient Observation (Order): 11/13/2017 16:13 CST, Tequila GRAJEDA, [facility], Ectopic pregnancy, No  .    Electrocardiogram:  Time 11/13/2017 14:30:00, rate 73, normal sinus rhythm, No ST-T changes, normal MS & QRS intervals, EP Interp.    Results review:  Lab results : Lab View   11/13/2017 10:28 CST     Sodium Lvl                136 mmol/L                             Potassium Lvl             4.2 mmol/L                             Chloride                  105 mmol/L                             CO2                       22.0 mmol/L                             Calcium Lvl               9.7 mg/dL                             Glucose Lvl               93 mg/dL                              BUN                       5.0 mg/dL  LOW                             Creatinine                0.74 mg/dL                             eGFR-AA                   >60 mL/min/1.73 m2  NA                             eGFR-IQRA                  >60 mL/min/1.73 m2  NA                             Bili Total                0.5 mg/dL                             Bili Direct               0.10 mg/dL                             Bili Indirect             0.40 mg/dL                             AST                       12 unit/L  LOW                             ALT                       20 unit/L                             Alk Phos                  70 unit/L                             Total Protein             7.5 gm/dL                             Albumin Lvl               3.90 gm/dL                             Globulin                  3.60 gm/dL  HI                             A/G Ratio                 1.1  NA                             Beta hCG Qnt              3,033.0 mIU/mL  NA                             WBC                       8.8 x10(3)/mcL                             RBC                       4.87 x10(6)/mcL                             Hgb                       14.2 gm/dL                             Hct                       41.1 %                             Platelet                  237 x10(3)/mcL                             MCV                       84.4 fL                             MCH                       29.2 pg                             MCHC                      34.5 gm/dL                             RDW                       15.4 %                             MPV                       10.7 fL                             Abs Neut                  5.35 x10(3)/mcL                             Neutro Auto               61 %  NA                             Lymph Auto                30 %  NA                             Mono Auto                 8 %  NA                             Eos  Auto                  0 %  NA                             Abs Eos                   0.0 x10(3)/mcL                             Basophil Auto             0 %  NA                             Abs Neutro                5.35 x10(3)/mcL                             Abs Lymph                 2.6 x10(3)/mcL                             Abs Mono                  0.7 x10(3)/mcL                             Abs Baso                  0.0 x10(3)/mcL    11/13/2017 10:15 CST     UA Appear                 CLEAR                             UA Color                  YELLOW                             UA Spec Grav              1.011                             UA Bili                   Negative                             UA pH                     6.5                             UA Urobilinogen           0.2                             UA Blood                  3+                             UA Glucose                Negative                             UA Ketones                Negative                             UA Protein                Negative                             UA Nitrite                Negative                             UA Leuk Est               Negative                             UA WBC                    NONE SEEN                             UA RBC                    72 /HPF  HI                             UA Bacteria               NONE SEEN /HPF                             UA Squam Epithelial       NONE SEEN    .   Radiology results:  Ultrasound, interpretation:  * Final Report *    Reason For Exam  Abdominal Pain    Radiology Report  Early OB ultrasound     Exam is performed both transabdominally and endovaginally     Uterus measures 8.3 x 5.6 x 5.5 cm. The endometrial stripe is 7 mm.     An intrauterine gestation is not identified.     The right ovary measures 4.5 x 5.5 cm. There is a cyst present  measuring 3.5 x 3.9 x 3.4 cm. There is flow present.     There is a complex area in the right adnexa measuring 3.9 x  3 x 3.9  cm. There is flow within this. The etiology of this is uncertain. A  fetal pole is not identified within this area.     The left ovary measures 2.6 x 1.7 x 3.3 cm. There is flow present.     IMPRESSION: An intrauterine gestation is not identified. Differential  would include very early intrauterine pregnancy versus complete AB  versus ectopic pregnancy.     Right ovarian cyst.     Complex lesion in the right adnexa.       Signature Line  Electronically Signed By: Yassine Anthony MD  Date/Time Signed: 2017 13:31      This document has an image    Result type: US OB 1st Trimester  Result date: 2017 13:18 CST  Result status: Auth (Verified)  Result title: US OB 1st Trimester  Performed by: Yassine Anthony MD on 2017 13:29 CST  Verified by: Yassine Anthony MD on 2017 13:31 CST  Encounter info: [id number], [facility], Emergency, 2017 -     .       Reexamination/ Reevaluation   Time: 2017 13:35:00 .   Notes: spoke with dr. guillory who states that he has not seen the patient for her pregnancy this pregnancy and that he wants me to call the [facility] unassigned doctor. I discussed that she has appt with him on thursday and that he has been her obgyn for the past 17 years and delivered her other children. he still continues to state that he hasn't seen her this pregnancy so wants me to call unassigned obgyn. .   Time: 2017 14:05:00 .   Notes: called Dr. Lopez cell phone and left message. then called L&D to make sure that dr. lopez was not upstairs doing  or vaginal delivery. they states she was not and that they had her has on call for ER as well. .   Time: 2017 15:00:00 .   Notes: called dr. olpez cell phone again, left message..   Time: 2017 15:35:00 .   Notes: called L&D again to see if they had another means to contact her, I was then transferred to Supriya Poon, L&D manager and discussed the situation with her. she states she will try  to call her and see if she can get in touch with her. .   Time: 11/13/2017 15:53:00 .   Notes: Supriya called back, states that she hasn't heard from dr. lopez but will go and get dr. cintron who is [facility] on call and see if he can come see the patient. .   Time: 11/13/2017 16:00:00 .   Notes: dr cintron came down to see and evaluate the patient. he states will admit for methotrexate for ectopic. .      Impression and Plan   Diagnosis   Ectopic pregnancy ([id number]-KP O00.90)   Plan   Condition: Stable.    Disposition: Admit time  11/13/2017 16:13:00, Place in Observation Unit, Reason for delay: wait pending consult.    Notes: spoke with dr. duff regarding the patient and she is aware and agrees with admission.       Addendum      Teaching-Supervisory Addendum-Brief   I participated in the following activities of this patients care: the medical history, the physical exam, medical decision making.   I personally performed: supervision of the patient's care, the medical history, the physical exam, the medical decision making.   The case was discussed with: the nurse practitioner.   Evaluation and management service: I agree with the evaluation and management decisions made in this patient's care.   Results interpretation: I agree with the study interpretation in this patient's care.   Notes: I conducted my own face-to-face evaluation of the patient and performed an independent history and physical examination of this patient. I discussed the MDM and reviewed the results with the NP.     Briefly, this is a 38 y/o F who presented to the ED for the 3rd time this week for evaluation of abdominal pain and vaginal bleeding at approximately 5 weeks pregnant by LMP. Prior ED visits w/ inappropriate elevation (very small) in BHCG and no IUP noted on US. Labs today also with only minimal increase in BHCG from 3 days prior, concerning for ectopic/nonviable gestation. US today also w/ no IUP but + adnexal mass seen, no fetal pole noted  in this mass. The case was discussed with GYN on call as patient's GYN had not yet seen the patient for this pregnancy and yields care to the on call physician. Dr. Mandel recommends admission under observation given severity of patient's pain, will reevaluate while inpatient for indications for procedure or subsequent testing. Findings and plan discussed with the patient, and she is agreeable to admission at this time.     Darya Whitley MD.

## 2022-04-30 NOTE — DISCHARGE SUMMARY
DISCHARGE DATE:  11/19/2017    DIAGNOSIS:  [age]-year-old female treated with methotrexate early in the week secondary to an ectopic.  She was admitted for 23-hour observation with abdominal pain and vaginal bleeding.  Patient in the ER was noted to have an increased amount of vaginal pain.  ER physician felt that the patient needed to be observed for 23-hours secondary to the significant amount of pain she was having.    HOSPITAL COURSE:  The patient, upon arrival to the floor, did complain of pain 8-9/10, radiating.  She was given one dose of IV pain medicine.  She had resolution of her pain.  She stated she was hungry.  She was able to tolerate a regular diet without difficulty.  She reports her pain is now 2-3/10.  Her H & H has been stable.  It was 12 and 36.1 on ______.  Today it is 11.5 and 33.5.  Her beta HCG has fallen from 1516 to 959, now down to 730.  Patient expresses desire to be discharged home.  She will be discharged home today.    CONDITION ON DISCHARGE:  Stable    DISCHARGE INSTRUCTIONS:    DIET:  Regular.   ACTIVITY:  Pelvic rest.   MEDICATIONS:  Motrin prn.   FOLLOW UP:  Follow up with clinic for continued ectopic management.       Patient was given strict instructions on ruptured ectopic, when to return to the ER.  She voiced good understanding.        ______________________________  MD OZ Mckenna Jr/BRO  DD:  11/19/2017  Time:  06:29AM  DT:  11/20/2017  Time:  11:58AM  Job #:  [id number]

## 2024-06-21 ENCOUNTER — HOSPITAL ENCOUNTER (EMERGENCY)
Facility: HOSPITAL | Age: 44
Discharge: HOME | End: 2024-06-21
Attending: EMERGENCY MEDICINE

## 2024-06-21 VITALS
OXYGEN SATURATION: 100 % | SYSTOLIC BLOOD PRESSURE: 180 MMHG | TEMPERATURE: 98 F | BODY MASS INDEX: 39.82 KG/M2 | WEIGHT: 202.81 LBS | RESPIRATION RATE: 16 BRPM | DIASTOLIC BLOOD PRESSURE: 84 MMHG | HEART RATE: 95 BPM

## 2024-06-21 DIAGNOSIS — N93.9 VAGINAL BLEEDING: Primary | ICD-10-CM

## 2024-06-21 PROCEDURE — 99283 EMERGENCY DEPT VISIT LOW MDM: CPT

## 2024-06-21 RX ORDER — IBUPROFEN 800 MG/1
800 TABLET ORAL EVERY 8 HOURS PRN
Qty: 20 TABLET | Refills: 0 | Status: SHIPPED | OUTPATIENT
Start: 2024-06-21

## 2024-06-21 NOTE — ED PROVIDER NOTES
"ED PROVIDER NOTE  6/21/2024    CHIEF COMPLAINT:   Chief Complaint   Patient presents with    ABNORMAL VAG BLEEDING     PT REPORTS ABNORMAL VAG BLEEDING W ? CERVICAL CA. SEEN AT [facility] 2 DAYS AGO AND TOLD TO FU W GYN.  PT REQUESTING GYN REFERRAL.  REPORTS ONLY SPOTTING AT PRESENT W INTERMITTENT DISCOMFORT TO LOWER ABD.        HISTORY OF PRESENT ILLNESS:   Sarath Abbott is a 43 y.o. female who presents with chief complaint "I need a gynecologist." patient states she came to the emergency department today after she was called around for a gynecologist and was told to just check into the ED.  She states that she has been having some vaginal bleeding since father's day along with some cramping low back pain and presented to [facility] and states that they did an ultrasound and showed that she had an abnormal cervix and was told that she may have cervical cancer and that she needed to see a gyn.    The history is provided by the patient.         REVIEW OF SYSTEMS: as noted in the HPI.  NURSING NOTES REVIEWED      PAST MEDICAL/SURGICAL HISTORY: History reviewed. No pertinent past medical history. History reviewed. No pertinent surgical history.    FAMILY HISTORY: No family history on file.    SOCIAL HISTORY:   Social History     Tobacco Use    Smoking status: Every Day     Current packs/day: 0.50     Average packs/day: 0.5 packs/day for 26.5 years (13.2 ttl pk-yrs)     Types: Cigarettes     Start date: 1998    Smokeless tobacco: Never   Substance Use Topics    Drug use: Not Currently       ALLERGIES:   Review of patient's allergies indicates:   Allergen Reactions    Morphine Shortness Of Breath    Asa [aspirin] Palpitations       PHYSICAL EXAM:  Initial Vitals [06/21/24 0938]   BP Pulse Resp Temp SpO2   (!) 180/84 95 16 97.5 °F (36.4 °C) 100 %      MAP       --         Physical Exam    Nursing note and vitals reviewed.  Constitutional: She appears well-developed and well-nourished.   HENT:   Head: Normocephalic and " atraumatic.   Mouth/Throat: Uvula is midline and mucous membranes are normal.   Eyes: EOM are normal. Pupils are equal, round, and reactive to light.   Neck: Trachea normal. Neck supple.   Cardiovascular:  Normal rate, regular rhythm, intact distal pulses and normal pulses.           Pulmonary/Chest: Effort normal and breath sounds normal.   Abdominal: Abdomen is soft. Bowel sounds are normal. There is no abdominal tenderness. There is no rebound and no guarding.   Musculoskeletal:         General: Normal range of motion.      Cervical back: Neck supple.     Neurological: She is alert and oriented to person, place, and time. GCS eye subscore is 4. GCS verbal subscore is 5. GCS motor subscore is 6.   Skin: Skin is warm and dry.   Psychiatric: She has a normal mood and affect. Her speech is normal. Thought content normal.         RESULTS:  Labs Reviewed - No data to display  Imaging Results    None         PROCEDURES:  Procedures    ECG:       ED COURSE AND MEDICAL DECISION MAKING:  Medications - No data to display        Medical Decision Making  43-year-old female who presents for gynecology referral after having imaging at another hospital that showed some cervical abnormality concerning for cervical cancer.  Given gyn referral as requested and recommend that she get her records from this outside hospital to provide gyn when she follows up.  Given strict ED return precautions. I have spoken with the patient and/or caregivers. I have explained the patient's condition, diagnoses and treatment plan based on the information available to me at this time. I have answered the patient's and/or caregiver's questions and addressed any concerns. The patient and/or caregivers have as good an understanding of the patient's diagnosis, condition and treatment plan as can be expected at this point. The vital signs have been stable. The patient's condition is stable and appropriate for discharge from the emergency department.      The patient will pursue further outpatient evaluation with the primary care physician or other designated or consulting physician as outlined in the discharge instructions. The patient and/or caregivers are agreeable to this plan of care and follow-up instructions have been explained in detail. The patient and/or caregivers have received these instructions in written format and have expressed an understanding of the discharge instructions. The patient and/or caregivers are aware that any significant change in condition or worsening of symptoms should prompt an immediate return to this or the closest emergency department or a call to 911.    Risk  OTC drugs.  Prescription drug management.        CLINICAL IMPRESSION:  1. Vaginal bleeding        DISPOSITION:   ED Disposition Condition    Discharge Stable            ED Prescriptions       Medication Sig Dispense Start Date End Date Auth. Provider    ibuprofen (ADVIL,MOTRIN) 800 MG tablet Take 1 tablet (800 mg total) by mouth every 8 (eight) hours as needed for Pain. 20 tablet 6/21/2024 -- Nakul Bryant,           Follow-up Information       Follow up With Specialties Details Why Contact Info    [facility] - Emergency Dept Emergency Medicine  If symptoms worsen [address] [facility] [id number] [phone]               Nakul Bryant DO  06/21/24 [id number]